# Patient Record
Sex: FEMALE | Race: BLACK OR AFRICAN AMERICAN | Employment: FULL TIME | URBAN - NONMETROPOLITAN AREA
[De-identification: names, ages, dates, MRNs, and addresses within clinical notes are randomized per-mention and may not be internally consistent; named-entity substitution may affect disease eponyms.]

---

## 2018-02-20 ENCOUNTER — HOSPITAL ENCOUNTER (OUTPATIENT)
Age: 48
Discharge: HOME | End: 2018-02-20
Payer: COMMERCIAL

## 2018-02-20 LAB
ALT SERPL-CCNC: 14 U/L (ref 11–66)
ANION GAP SERPL CALCULATED.3IONS-SCNC: 14 MEQ/L (ref 8–16)
AST SERPL-CCNC: 17 U/L (ref 5–40)
BASOPHILS # BLD: 0.6 %
BASOPHILS ABSOLUTE: 0 THOU/MM3 (ref 0–0.1)
BILIRUBIN URINE: NEGATIVE
BLOOD, URINE: NEGATIVE
BUN BLDV-MCNC: 9 MG/DL (ref 7–22)
CALCIUM SERPL-MCNC: 9.4 MG/DL (ref 8.5–10.5)
CHARACTER, URINE: CLEAR
CHLORIDE BLD-SCNC: 103 MEQ/L (ref 98–111)
CHOLESTEROL, TOTAL: 238 MG/DL (ref 100–199)
CO2: 24 MEQ/L (ref 23–33)
COLOR: YELLOW
CREAT SERPL-MCNC: 1.1 MG/DL (ref 0.4–1.2)
EOSINOPHIL # BLD: 0.8 %
EOSINOPHILS ABSOLUTE: 0 THOU/MM3 (ref 0–0.4)
GFR SERPL CREATININE-BSD FRML MDRD: 64 ML/MIN/1.73M2
GLUCOSE BLD-MCNC: 119 MG/DL (ref 70–108)
GLUCOSE, URINE: NEGATIVE MG/DL
HCT VFR BLD CALC: 39.6 % (ref 37–47)
HDLC SERPL-MCNC: 78 MG/DL
HEMOGLOBIN: 13.7 GM/DL (ref 12–16)
KETONES, URINE: NEGATIVE
LDL CHOLESTEROL CALCULATED: 137 MG/DL
LEUKOCYTE EST, POC: NEGATIVE
LYMPHOCYTES # BLD: 51.5 %
LYMPHOCYTES ABSOLUTE: 1.8 THOU/MM3 (ref 1–4.8)
MCH RBC QN AUTO: 31.6 PG (ref 27–31)
MCHC RBC AUTO-ENTMCNC: 34.6 GM/DL (ref 33–37)
MCV RBC AUTO: 91.4 FL (ref 81–99)
MONOCYTES # BLD: 7.4 %
MONOCYTES ABSOLUTE: 0.3 THOU/MM3 (ref 0.4–1.3)
NITRITE, URINE: NEGATIVE
NUCLEATED RED BLOOD CELLS: 0 /100 WBC
PDW BLD-RTO: 13.2 % (ref 11.5–14.5)
PH UA: 5.5
PLATELET # BLD: 287 THOU/MM3 (ref 130–400)
PMV BLD AUTO: 7.2 FL (ref 7.4–10.4)
POTASSIUM SERPL-SCNC: 4.3 MEQ/L (ref 3.5–5.2)
PROTEIN UA: NEGATIVE MG/DL
RBC # BLD: 4.33 MILL/MM3 (ref 4.2–5.4)
SEG NEUTROPHILS: 39.7 %
SEGMENTED NEUTROPHILS ABSOLUTE COUNT: 1.4 THOU/MM3 (ref 1.8–7.7)
SODIUM BLD-SCNC: 141 MEQ/L (ref 135–145)
SPECIFIC GRAVITY UA: 1.02 (ref 1–1.03)
TRIGL SERPL-MCNC: 115 MG/DL (ref 0–199)
UROBILINOGEN, URINE: 0.2 EU/DL
WBC # BLD: 3.5 THOU/MM3 (ref 4.8–10.8)

## 2018-02-20 PROCEDURE — 85025 COMPLETE CBC W/AUTO DIFF WBC: CPT

## 2018-02-20 PROCEDURE — 80061 LIPID PANEL: CPT

## 2018-02-20 PROCEDURE — 80048 BASIC METABOLIC PNL TOTAL CA: CPT

## 2018-02-20 PROCEDURE — 81003 URINALYSIS AUTO W/O SCOPE: CPT

## 2018-02-20 PROCEDURE — 84460 ALANINE AMINO (ALT) (SGPT): CPT

## 2018-02-20 PROCEDURE — 36415 COLL VENOUS BLD VENIPUNCTURE: CPT

## 2018-02-20 PROCEDURE — 84450 TRANSFERASE (AST) (SGOT): CPT

## 2018-05-03 ENCOUNTER — EMPLOYEE WELLNESS (OUTPATIENT)
Dept: OTHER | Age: 48
End: 2018-05-03

## 2018-05-03 LAB
CHOLESTEROL, TOTAL: 225 MG/DL (ref 0–199)
FASTING: YES
GLUCOSE BLD-MCNC: 122 MG/DL (ref 74–109)
HDLC SERPL-MCNC: 77 MG/DL (ref 40–90)
LDL CHOLESTEROL CALCULATED: 131 MG/DL
TRIGL SERPL-MCNC: 83 MG/DL (ref 0–199)

## 2018-05-14 VITALS — WEIGHT: 160 LBS

## 2018-09-28 ENCOUNTER — HOSPITAL ENCOUNTER (OUTPATIENT)
Dept: WOMENS IMAGING | Age: 48
Discharge: HOME | End: 2018-09-28
Payer: COMMERCIAL

## 2018-09-28 DIAGNOSIS — Z12.31 VISIT FOR SCREENING MAMMOGRAM: ICD-10-CM

## 2018-09-28 PROCEDURE — 77067 SCR MAMMO BI INCL CAD: CPT

## 2019-01-04 ENCOUNTER — NURSE TRIAGE (OUTPATIENT)
Dept: ADMINISTRATIVE | Age: 49
End: 2019-01-04

## 2019-04-12 ENCOUNTER — EMPLOYEE WELLNESS (OUTPATIENT)
Dept: OTHER | Age: 49
End: 2019-04-12

## 2019-04-12 LAB
CHOLESTEROL, TOTAL: 237 MG/DL (ref 0–199)
FASTING: YES
GLUCOSE BLD-MCNC: 94 MG/DL (ref 74–109)
HDLC SERPL-MCNC: 71 MG/DL (ref 40–90)
LDL CHOLESTEROL CALCULATED: 154 MG/DL
TRIGL SERPL-MCNC: 62 MG/DL (ref 0–199)

## 2019-04-15 VITALS — WEIGHT: 147 LBS

## 2020-09-21 ENCOUNTER — EMPLOYEE WELLNESS (OUTPATIENT)
Dept: OTHER | Age: 50
End: 2020-09-21

## 2020-09-21 LAB
CHOLESTEROL, TOTAL: 235 MG/DL (ref 0–199)
FASTING: YES
GLUCOSE BLD-MCNC: 104 MG/DL (ref 74–109)
HDLC SERPL-MCNC: 67 MG/DL (ref 40–90)
LDL CHOLESTEROL CALCULATED: 140 MG/DL
TRIGL SERPL-MCNC: 141 MG/DL (ref 0–199)

## 2020-09-23 ENCOUNTER — HOSPITAL ENCOUNTER (OUTPATIENT)
Dept: WOMENS IMAGING | Age: 50
Discharge: HOME | End: 2020-09-23
Payer: COMMERCIAL

## 2020-09-23 PROCEDURE — 77063 BREAST TOMOSYNTHESIS BI: CPT

## 2020-09-26 ENCOUNTER — NURSE ONLY (OUTPATIENT)
Dept: LAB | Age: 50
End: 2020-09-26

## 2020-09-26 LAB
ALBUMIN SERPL-MCNC: 4.2 G/DL (ref 3.5–5.1)
ALP BLD-CCNC: 58 U/L (ref 38–126)
ALT SERPL-CCNC: 17 U/L (ref 11–66)
ANION GAP SERPL CALCULATED.3IONS-SCNC: 11 MEQ/L (ref 8–16)
AST SERPL-CCNC: 19 U/L (ref 5–40)
AVERAGE GLUCOSE: 141 MG/DL (ref 70–126)
BASOPHILS # BLD: 0.7 %
BASOPHILS ABSOLUTE: 0 THOU/MM3 (ref 0–0.1)
BILIRUB SERPL-MCNC: 0.3 MG/DL (ref 0.3–1.2)
BILIRUBIN URINE: NEGATIVE
BLOOD, URINE: NEGATIVE
BUN BLDV-MCNC: 11 MG/DL (ref 7–22)
CALCIUM SERPL-MCNC: 9.5 MG/DL (ref 8.5–10.5)
CHARACTER, URINE: CLEAR
CHLORIDE BLD-SCNC: 105 MEQ/L (ref 98–111)
CHOLESTEROL, TOTAL: 255 MG/DL (ref 100–199)
CO2: 23 MEQ/L (ref 23–33)
COLOR: YELLOW
CREAT SERPL-MCNC: 1.1 MG/DL (ref 0.4–1.2)
EOSINOPHIL # BLD: 1.5 %
EOSINOPHILS ABSOLUTE: 0.1 THOU/MM3 (ref 0–0.4)
ERYTHROCYTE [DISTWIDTH] IN BLOOD BY AUTOMATED COUNT: 12.1 % (ref 11.5–14.5)
ERYTHROCYTE [DISTWIDTH] IN BLOOD BY AUTOMATED COUNT: 40.9 FL (ref 35–45)
GLUCOSE BLD-MCNC: 124 MG/DL (ref 70–108)
GLUCOSE, URINE: NEGATIVE MG/DL
HBA1C MFR BLD: 6.7 % (ref 4.4–6.4)
HCT VFR BLD CALC: 40.4 % (ref 37–47)
HDLC SERPL-MCNC: 70 MG/DL
HEMOGLOBIN: 13.7 GM/DL (ref 12–16)
IMMATURE GRANS (ABS): 0 THOU/MM3 (ref 0–0.07)
IMMATURE GRANULOCYTES: 0 %
KETONES, URINE: NEGATIVE
LDL CHOLESTEROL CALCULATED: 170 MG/DL
LEUKOCYTE EST, POC: NEGATIVE
LYMPHOCYTES # BLD: 59.5 %
LYMPHOCYTES ABSOLUTE: 2.4 THOU/MM3 (ref 1–4.8)
MCH RBC QN AUTO: 31.4 PG (ref 26–33)
MCHC RBC AUTO-ENTMCNC: 33.9 GM/DL (ref 32.2–35.5)
MCV RBC AUTO: 92.4 FL (ref 81–99)
MONOCYTES # BLD: 8 %
MONOCYTES ABSOLUTE: 0.3 THOU/MM3 (ref 0.4–1.3)
NITRITE, URINE: NEGATIVE
NUCLEATED RED BLOOD CELLS: 0 /100 WBC
PH UA: 5 (ref 5–9)
PLATELET # BLD: 274 THOU/MM3 (ref 130–400)
PMV BLD AUTO: 9.1 FL (ref 9.4–12.4)
POTASSIUM SERPL-SCNC: 5.1 MEQ/L (ref 3.5–5.2)
PROTEIN UA: NEGATIVE MG/DL
RBC # BLD: 4.37 MILL/MM3 (ref 4.2–5.4)
SEG NEUTROPHILS: 30.3 %
SEGMENTED NEUTROPHILS ABSOLUTE COUNT: 1.2 THOU/MM3 (ref 1.8–7.7)
SODIUM BLD-SCNC: 139 MEQ/L (ref 135–145)
SPECIFIC GRAVITY UA: 1.02 (ref 1–1.03)
T4 FREE: 0.98 NG/DL (ref 0.93–1.76)
TOTAL PROTEIN: 7.3 G/DL (ref 6.1–8)
TRIGL SERPL-MCNC: 76 MG/DL (ref 0–199)
TSH SERPL DL<=0.05 MIU/L-ACNC: 1.1 UIU/ML (ref 0.4–4.2)
UROBILINOGEN, URINE: 0.2 EU/DL (ref 0–1)
WBC # BLD: 4.1 THOU/MM3 (ref 4.8–10.8)

## 2020-10-06 ENCOUNTER — HOSPITAL ENCOUNTER (OUTPATIENT)
Dept: WOMENS IMAGING | Age: 50
End: 2020-10-06
Payer: COMMERCIAL

## 2020-10-06 ENCOUNTER — HOSPITAL ENCOUNTER (OUTPATIENT)
Dept: WOMENS IMAGING | Age: 50
Discharge: HOME | End: 2020-10-06
Payer: COMMERCIAL

## 2020-10-06 LAB — GFR SERPL CREATININE-BSD FRML MDRD: 63 ML/MIN/1.73M2

## 2020-10-06 PROCEDURE — G0279 TOMOSYNTHESIS, MAMMO: HCPCS

## 2021-02-26 ENCOUNTER — IMMUNIZATION (OUTPATIENT)
Dept: PRIMARY CARE CLINIC | Age: 51
End: 2021-02-26
Payer: COMMERCIAL

## 2021-02-26 PROCEDURE — 91300 COVID-19, PFIZER VACCINE 30MCG/0.3ML DOSE: CPT | Performed by: FAMILY MEDICINE

## 2021-02-26 PROCEDURE — 0001A COVID-19, PFIZER VACCINE 30MCG/0.3ML DOSE: CPT | Performed by: FAMILY MEDICINE

## 2021-03-05 ENCOUNTER — HOSPITAL ENCOUNTER (OUTPATIENT)
Dept: PHYSICAL THERAPY | Age: 51
Setting detail: THERAPIES SERIES
Discharge: HOME | End: 2021-03-05
Payer: COMMERCIAL

## 2021-03-05 PROCEDURE — 97161 PT EVAL LOW COMPLEX 20 MIN: CPT

## 2021-03-05 PROCEDURE — 97110 THERAPEUTIC EXERCISES: CPT

## 2021-03-05 NOTE — FLOWSHEET NOTE
** PLEASE SIGN, DATE AND TIME CERTIFICATION BELOW AND RETURN TO [facility] OUTPATIENT REHABILITATION (FAX #: [phone]). ATTEST/CO-SIGN IF ACCESSING VIA [employer]. THANK YOU.**    I certify that I have examined the patient below and determined that Physical Medicine and Rehabilitation service is necessary and that I approve the established plan of care for up to 90 days or as specifically noted. Attestation, signature or co-signature of physician indicates approval of certification requirements.    ________________________ ____________ __________  Physician Signature   Date   Time  [address] [facility]  PHYSICAL THERAPY  [x] EVALUATION  [] DAILY NOTE (LAND) [] DAILY NOTE (AQUATIC ) [] PROGRESS NOTE [] DISCHARGE NOTE    [x] [address] [facility]   [] [place] [address]    [] [address] [facility]   [] Carry Minder    Date: 3/5/2021  Patient Name:  Randee Gomes  : 1970  MRN: [id number]  CSN: [id number]    Referring Practitioner Manuel Nguyen DPM   Diagnosis Plantar fascial fibromatosis [M72.2]  Knuckle pads [M72.1]  Pain in right foot [M79.671]    Treatment Diagnosis B foot pain, B ankle stiffness, muscular weakness, unsteady on feet   Date of Evaluation 3/5/21    Additional Pertinent History NA      Functional Outcome Measure Used FAAM (Foot Ankle Ability Measure)   Functional Outcome Score 75/84 or 11% impairment (3/5/21)       Insurance: Primary: Payor: Rafael Rucker /  /  / ,   Secondary:    Authorization Information: 10% copay, unlimited visits, aquatic and modalities covered   Visit # 1, 1/10 for progress note   Visits Allowed: unlimited   Recertification Date:    Physician Follow-Up: unknown   Physician Orders:    History of Present Illness: 2 year history of foot pain, started when her work changed roles and she had to stand and walk more often.    Dr. Jeimy Sorenson gave her insoles, performing foot stretching exercises, tries to soak her feet, and wears shoes even inside her home. Started on Medrol pack this week. SUBJECTIVE: Patient c/o B pain in heels and feet, lateral foot and bottom of balls of feet. Most of the time her pain was bad first few steps out of bed, standing at work and walking. She gets pain through the shin and calf mostly on the Left. Social/Functional History and Current Status:  Medications and Allergies have been reviewed and are listed on Medical History Questionnaire. Ahsan Singleton lives alone in a multiple floor home with stairs and a handrail to enter. 4 steps to access home. Task Previous Current   ADLs  Independent Independent   IADL's Independent Modified Independent - takes sitting breaks frequently   Ambulation Independent Independent   Transfers Independent Independent   Recreation Independent  - walking for recreation, able to walk a couple hours Modified Independent has had to shorten her distance due to pain   Community Integration Independent Independent   Driving Active  Active    Work [employer]. 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 Full-Time. 8 hour days      OBJECTIVE:    Pain: 1/10 pain in B heels   Palpation Pain in plantar fascia origin bilaterally, 1st MTP and peroneals in the midleg. Observation Left foot more pes planus and valgus deformity compared to Right. Posture        Range of Motion L ankle DF 0-15 deg  R ankle DF 0-17 deg   Strength Toe flex/ext WFL   Ankle DF 5/5, Ankle PF 4+/5  Ankle Inv 5/5, Ankle Evr 5/5 bilaterally   Coordination    Sensation Left foot tingling at times with sleeping and standing   Bed Mobility    Transfers    Ambulation With no shoes, equal step no antalgia, but more Left foot valgus evident.     Stairs Reciprocal pattern, no LOB, no railing   Balance Single leg eyes closed 3 sec left, 7 sec R   Special Tests          TREATMENT   Precautions: Goes by \"Yashira\"     Pain: B heel pain X in shaded column indicates activity completed today   Modalities Parameters/  Location  Notes                     Manual Therapy Time/Technique  Notes                     Exercise/Intervention   Notes   Rolling arches on ball  3 min ea  x    Seated soleus stretch - foot flat on floor under chair 30 sec ea  x    Seated plantar fascia stretch - foot in lap, extending toes for self stretch 30 sec ea  x                                                              Specific Interventions Next Treatment: Start session with prone manual therapy to B gastroc, soleus, peroneals, Achilles, and plantar fascia. Soft tissue mobilization with Hawkgrips for IASTM. Follow with stretching, rolling arches with ball, and standing balance exercises. Finish with ice as needed for pain. Dry needling was also on script if needed for pain. Activity/Treatment Tolerance:  [x]  Patient tolerated treatment well  []  Patient limited by fatigue  []  Patient limited by pain   []  Patient limited by medical complications  []  Other:     Assessment: Patient presents with restricted mobility and pain consistent with physician diagnosis of B plantar fasciitis. She has difficulty working full shift at hospital, walking for recreation and exercise long distances. She would benefit from physical therapy to decrease foot pain, improve flexibility of plantar fascia and lower leg, and to improve balance and strength in order to tolerate full work shift and walking long distances. Body Structures/Functions/Activity Limitations: impaired balance, impaired ROM, impaired strength, pain and abnormal gait  Prognosis: good    GOALS:  Patient Goal: decrease foot pain    Short Term Goals:  Time Frame: 6 weeks    Patient will report decreased B heel pain from baseline 1/10 to less than 0/10 during therapy session in order to decrease sharp heel pain with first few steps out of bed.     Patient will improve B ankle AROM to 0-20 deg ankle DF, and negative heel pain with passive toe extension in order to navigate steps, squat and stand long durations at work. Patient will improve standing balance to 10 sec SLS eyes closed in order to stabilize B ankles with uneven surfaces and stairs. Patient will be IND with HEP in order to meet long term goals. Long Term Goals:  Time Frame: 12 weeks    Patient will improve B ankle strength to 5/5 all directions in order to stabilize ankles and foot for barefoot walking. Patient will improve score of FAAM questionnaire from baseline 11% impairment to at least 5% impaired in order to resume regular walking exercise and tolerate work more comfortably. Patient will ascend/descend 12 steps with no railing and reciprocal pattern in order to access all areas of her house and work. Patient Education:   [x]  HEP/Education Completed: Plan of Care, Goals, Moist heat, then rolling/stretching, and finish with ice. Wear footwear in house. [employer] Access Code: [email or web address]   []  No new Education completed  []  Reviewed Prior HEP      []  Patient verbalized and/or demonstrated understanding of education provided. []  Patient unable to verbalize and/or demonstrate understanding of education provided. Will continue education. []  Barriers to learning: None    PLAN:  Treatment Recommendations: Strengthening, Range of Motion, Balance Training, Gait Training, Stair Training, Manual Therapy - Soft Tissue Mobilization, Manual Therapy - Joint Manipulation, Pain Management, Home Exercise Program, Patient Education, Safety Education and Training, Integrative Dry Needling, Aquatics and Modalities    [x]  Plan of care initiated. Plan to see patient 2 times per week for 12 weeks to address the treatment planned outlined above.   []  Continue with current plan of care  []  Modify plan of care as follows:    []  Hold pending physician visit  []  Discharge    Time In 1115   Time Out 1215   Timed Code Minutes: 10 min   Total Treatment Time: 60 min       Electronically Signed by: Chasidy Thomson

## 2021-03-09 ENCOUNTER — HOSPITAL ENCOUNTER (OUTPATIENT)
Dept: PHYSICAL THERAPY | Age: 51
Setting detail: THERAPIES SERIES
Discharge: HOME | End: 2021-03-09
Payer: COMMERCIAL

## 2021-03-09 PROCEDURE — 97110 THERAPEUTIC EXERCISES: CPT

## 2021-03-09 PROCEDURE — 97140 MANUAL THERAPY 1/> REGIONS: CPT

## 2021-03-09 NOTE — PROGRESS NOTES
[address] [facility]  PHYSICAL THERAPY  [] EVALUATION  [x] DAILY NOTE (LAND) [] DAILY NOTE (AQUATIC ) [] PROGRESS NOTE [] DISCHARGE NOTE    [x] OUTPATIENT REHABILITATION CENTER [facility]   [] [place] [address]    [] [facility]   [] Juana Alvares    Date: 3/9/2021  Patient Name:  Dennie Milroy  : 1970  MRN: [id number]  CSN: [id number]    Referring Practitioner Katlin Friend DPM   Diagnosis Plantar fascial fibromatosis [M72.2]  Knuckle pads [M72.1]  Pain in right foot [M79.671]    Treatment Diagnosis B foot pain, B ankle stiffness, muscular weakness, unsteady on feet   Date of Evaluation 3/5/21    Additional Pertinent History NA      Functional Outcome Measure Used FAAM (Foot Ankle Ability Measure)   Functional Outcome Score 75/84 or 11% impairment (3/5/21)       Insurance: Primary: Payor: Mahin Hassan /  /  / ,   Secondary:    Authorization Information: 10% copay, unlimited visits, aquatic and modalities covered   Visit # 2, 2/10 for progress note   Visits Allowed: unlimited   Recertification Date: 34   Physician Follow-Up: unknown   Physician Orders:    History of Present Illness: 2 year history of foot pain, started when her work changed roles and she had to stand and walk more often. Dr. Verito Stahl gave her insoles, performing foot stretching exercises, tries to soak her feet, and wears shoes even inside her home. Started on Medrol pack this week. SUBJECTIVE:  Patient states the stretches at home seem to be ok. Complaints of pain increasing after being up on her feet. \"I just want it to go away. I want my feet back. \"    TREATMENT   Precautions: Goes by \"Yashira\"     Pain: 8/10 Bilateral feet heel and arch    X in shaded column indicates activity completed today   Modalities Parameters/  Location  Notes               Manual Therapy Time/Technique  Notes   Astym to Right Lower leg 10 minutes X Restrictions noted at ankle mortice, soleus muscle belly, gastroc, peroneals, heel, plantar fascia   Astyem to Left Lower leg 10 minutes X Restrictions noted at ankle mortice, soleus muscle belly, achilles tendon, heel, plantar fascia         Exercise/Intervention   Notes   SportsArt bike (seat 2) Level 2 5 minutes X Warm-up prior to Astym          Rolling arches on ball 3 min ea      Seated soleus stretch - foot flat on floor under chair 3x bilateral  20 seconds X    Seated plantar fascia stretch - foot in lap, extending toes for self stretch 30 sec ea  X           Gastroc stretch at steps 3x bilateral  20 seconds X    Heel raises 10x  X    Plantar fascia stretch at wall 3x bilateral 15 seconds X                                  Specific Interventions Next Treatment: Start session with prone manual therapy to B gastroc, soleus, peroneals, Achilles, and plantar fascia. Soft tissue mobilization with Hawkgrips for IASTM. Follow with stretching, rolling arches with ball, and standing balance exercises. Finish with ice as needed for pain. Dry needling was also on script if needed for pain. Activity/Treatment Tolerance:  [x]  Patient tolerated treatment well  []  Patient limited by fatigue  []  Patient limited by pain   []  Patient limited by medical complications  []  Other:     Assessment: Introduced patient to Astym treatment today to decrease restrictions and promote healing of healthy tissue. Patient had restrictions as noted above. Good tolerance of treatment with stretches post-Astym. Significant decrease in pain with patient rating pain 3/10 at end of session. GOALS:  Patient Goal: decrease foot pain    Short Term Goals:  Time Frame: 6 weeks    Patient will report decreased B heel pain from baseline 1/10 to less than 0/10 during therapy session in order to decrease sharp heel pain with first few steps out of bed.     Patient will improve B ankle AROM to 0-20 deg ankle DF, and negative heel pain with passive toe extension in order to navigate steps, squat and stand long durations at work. Patient will improve standing balance to 10 sec SLS eyes closed in order to stabilize B ankles with uneven surfaces and stairs. Patient will be IND with HEP in order to meet long term goals. Long Term Goals:  Time Frame: 12 weeks    Patient will improve B ankle strength to 5/5 all directions in order to stabilize ankles and foot for barefoot walking. Patient will improve score of FAAM questionnaire from baseline 11% impairment to at least 5% impaired in order to resume regular walking exercise and tolerate work more comfortably. Patient will ascend/descend 12 steps with no railing and reciprocal pattern in order to access all areas of her house and work. Patient Education:   [x]  HEP/Education Completed:  Astym treatment and benefits. Use of frozen water bottle. [employer] Access Code: [email or web address]   []  No new Education completed  [x]  Reviewed Prior HEP      []  Patient verbalized and/or demonstrated understanding of education provided. []  Patient unable to verbalize and/or demonstrate understanding of education provided. Will continue education. []  Barriers to learning: None    PLAN:  Treatment Recommendations: Strengthening, Range of Motion, Balance Training, Gait Training, Stair Training, Manual Therapy - Soft Tissue Mobilization, Manual Therapy - Joint Manipulation, Pain Management, Home Exercise Program, Patient Education, Safety Education and Training, Integrative Dry Needling, Aquatics and Modalities    []  Plan of care initiated. Plan to see patient 2 times per week for 12 weeks to address the treatment planned outlined above.   [x]  Continue with current plan of care  []  Modify plan of care as follows:    []  Hold pending physician visit  []  Discharge    Time In 1500   Time Out 1540   Timed Code Minutes: 40 min   Total Treatment Time: 40 min       Electronically Signed by: Vernell Dumont

## 2021-03-12 ENCOUNTER — HOSPITAL ENCOUNTER (OUTPATIENT)
Dept: PHYSICAL THERAPY | Age: 51
Setting detail: THERAPIES SERIES
Discharge: HOME | End: 2021-03-12
Payer: COMMERCIAL

## 2021-03-12 PROCEDURE — 97110 THERAPEUTIC EXERCISES: CPT

## 2021-03-12 PROCEDURE — 97140 MANUAL THERAPY 1/> REGIONS: CPT

## 2021-03-12 NOTE — PROGRESS NOTES
[address] [facility]  PHYSICAL THERAPY  [] EVALUATION  [x] DAILY NOTE (LAND) [] DAILY NOTE (AQUATIC ) [] PROGRESS NOTE [] DISCHARGE NOTE    [x] OUTPATIENT REHABILITATION CENTER [facility]   [] [place] [address]    [] [facility]   [] Doc Has    Date: 3/12/2021  Patient Name:  Martina Zhang  : 1970  MRN: [id number]  CSN: [id number]    Referring Practitioner Isaac Spears DPM   Diagnosis Plantar fascial fibromatosis [M72.2]  Knuckle pads [M72.1]  Pain in right foot [M79.671]    Treatment Diagnosis B foot pain, B ankle stiffness, muscular weakness, unsteady on feet   Date of Evaluation 3/5/21    Additional Pertinent History NA      Functional Outcome Measure Used FAAM (Foot Ankle Ability Measure)   Functional Outcome Score 75/84 or 11% impairment (3/5/21)       Insurance: Primary: Payor: Favio Pritchett /  /  / ,   Secondary:    Authorization Information: 10% copay, unlimited visits, aquatic and modalities covered   Visit # 3, 3/10 for progress note   Visits Allowed: unlimited   Recertification Date: 43   Physician Follow-Up: unknown   Physician Orders:    History of Present Illness: 2 year history of foot pain, started when her work changed roles and she had to stand and walk more often. Dr. Nii Calderon gave her insoles, performing foot stretching exercises, tries to soak her feet, and wears shoes even inside her home. Started on Medrol pack this week. SUBJECTIVE:  Patient has been rolling and digging with her arches, stretching her gastroc frequently. She notices less left leg outer pain but still 5/10 in the heels while working.      TREATMENT   Precautions: Goes by Author Coleman"     Pain: 5/10 Bilateral feet heel and arch    X in shaded column indicates activity completed today   Modalities Parameters/  Location  Notes               Manual Therapy Time/Technique  Notes   Soft tissue mobilization with Hawkgrips HG7 to Right Lower leg 10 minutes X Restrictions noted at ankle mortice, soleus muscle belly, gastroc, peroneals, achilles, heel, plantar fascia   Soft tissue mobilization with Hawkgrips HG7 to Left Lower leg 10 minutes X Restrictions noted at ankle mortice, soleus muscle belly, achilles tendon, achilles, heel, plantar fascia   Passive toe extension and ankle DF stretch 3x 30 sec x    Exercise/Intervention   Notes   SportsArt bike (seat 2) Level 2 5 minutes X Warm-up prior to manual therapy          Rolling arches on ball 3 min ea      Seated soleus stretch - foot flat on floor under chair 3x bilateral  20 seconds     Seated plantar fascia stretch - foot in lap, extending toes for self stretch 30 sec ea             Gastroc stretch at steps 3x bilateral  20 seconds X    Heel raises 10x      Plantar fascia stretch at wall 3x bilateral 15 seconds X    Standing gastroc stretch 3x 30 sec x                           Specific Interventions Next Treatment: Start session with prone manual therapy to B gastroc, soleus, peroneals, Achilles, and plantar fascia. Soft tissue mobilization with Hawkgrips for IASTM. Follow with stretching, rolling arches with ball, and standing balance exercises. Finish with ice as needed for pain. Dry needling was also on script if needed for pain. Activity/Treatment Tolerance:  [x]  Patient tolerated treatment well  []  Patient limited by fatigue  []  Patient limited by pain   []  Patient limited by medical complications  []  Other:     Assessment: Continued with emphasis on manual therapy of B lower leg muscles and plantar fascia. Good tolerance and followed with stretching. Pain reduced to 3/10 at end. GOALS:  Patient Goal: decrease foot pain    Short Term Goals:  Time Frame: 6 weeks    Patient will report decreased B heel pain from baseline 1/10 to less than 0/10 during therapy session in order to decrease sharp heel pain with first few steps out of bed.     Patient will improve B ankle AROM to 0-20 deg ankle DF, and negative heel pain with passive toe extension in order to navigate steps, squat and stand long durations at work. Patient will improve standing balance to 10 sec SLS eyes closed in order to stabilize B ankles with uneven surfaces and stairs. Patient will be IND with HEP in order to meet long term goals. Long Term Goals:  Time Frame: 12 weeks    Patient will improve B ankle strength to 5/5 all directions in order to stabilize ankles and foot for barefoot walking. Patient will improve score of FAAM questionnaire from baseline 11% impairment to at least 5% impaired in order to resume regular walking exercise and tolerate work more comfortably. Patient will ascend/descend 12 steps with no railing and reciprocal pattern in order to access all areas of her house and work. Patient Education:   [x]  HEP/Education Completed:  Astym treatment and benefits. Use of frozen water bottle. [email or web address] Access Code: [email or web address]   []  No new Education completed  [x]  Reviewed Prior HEP      []  Patient verbalized and/or demonstrated understanding of education provided. []  Patient unable to verbalize and/or demonstrate understanding of education provided. Will continue education. []  Barriers to learning: None    PLAN:  Treatment Recommendations: Strengthening, Range of Motion, Balance Training, Gait Training, Stair Training, Manual Therapy - Soft Tissue Mobilization, Manual Therapy - Joint Manipulation, Pain Management, Home Exercise Program, Patient Education, Safety Education and Training, Integrative Dry Needling, Aquatics and Modalities    []  Plan of care initiated. Plan to see patient 2 times per week for 12 weeks to address the treatment planned outlined above. [x]  Continue with current plan of care  []  Modify plan of care as follows:    []  Hold pending physician visit  []  Discharge    Time In 1035   Time Out 1110   Timed Code Minutes: 35   Total Treatment Time: 35       Electronically Signed by: Rashi Zhao. Horacio Marvin

## 2021-03-16 ENCOUNTER — HOSPITAL ENCOUNTER (OUTPATIENT)
Dept: PHYSICAL THERAPY | Age: 51
Setting detail: THERAPIES SERIES
Discharge: HOME | End: 2021-03-16
Payer: COMMERCIAL

## 2021-03-16 PROCEDURE — 97110 THERAPEUTIC EXERCISES: CPT

## 2021-03-16 PROCEDURE — 97140 MANUAL THERAPY 1/> REGIONS: CPT

## 2021-03-16 NOTE — PROGRESS NOTES
[address] [facility]  PHYSICAL THERAPY  [] EVALUATION  [x] DAILY NOTE (LAND) [] DAILY NOTE (AQUATIC ) [] PROGRESS NOTE [] DISCHARGE NOTE    [x] OUTPATIENT REHABILITATION CENTER [facility]   [] [place] [address]    [] [facility]   [] Damian Murry    Date: 3/16/2021  Patient Name:  Garth Doshi  : 1970  MRN: [id number]  CSN: [id number]    Referring Practitioner Audelia Bumpers, DPM   Diagnosis Plantar fascial fibromatosis [M72.2]  Knuckle pads [M72.1]  Pain in right foot [M79.671]    Treatment Diagnosis B foot pain, B ankle stiffness, muscular weakness, unsteady on feet   Date of Evaluation 3/5/21    Additional Pertinent History NA      Functional Outcome Measure Used FAAM (Foot Ankle Ability Measure)   Functional Outcome Score 75/84 or 11% impairment (3/5/21)       Insurance: Primary: Payor: Diallo Hale /  /  / ,   Secondary:    Authorization Information: 10% copay, unlimited visits, aquatic and modalities covered   Visit # 4, 4/10 for progress note   Visits Allowed: unlimited   Recertification Date:    Physician Follow-Up: unknown   Physician Orders:    History of Present Illness: 2 year history of foot pain, started when her work changed roles and she had to stand and walk more often. Dr. Tatiana Wei gave her insoles, performing foot stretching exercises, tries to soak her feet, and wears shoes even inside her home. Started on Medrol pack this week. SUBJECTIVE:  Patient reports feels therapy is helping. States the knots and tightness in her calfs is a lot better. States right foot not feeling too bad today, pain more on left side but is getting better.   TREATMENT   Precautions: Goes by Samantha Vargas"     Pain: 4/10 Bilateral feet heel and arch    X in shaded column indicates activity completed today   Modalities Parameters/  Location  Notes               Manual Therapy Time/Technique  Notes   Soft tissue mobilization with Hawkgrips HG7 to Right Lower leg 10 minutes X Restrictions noted at ankle mortice, soleus muscle belly, gastroc, peroneals, achilles, heel, plantar fascia   Soft tissue mobilization with Hawkgrips HG7 to Left Lower leg 10 minutes X Restrictions noted at ankle mortice, soleus muscle belly, achilles tendon, achilles, heel, plantar fascia   Passive toe extension and ankle DF stretch 3x 30 sec     Exercise/Intervention   Notes   SportsArt bike (seat 2) Level 2 5 minutes X Warm-up prior to manual therapy          Rolling arches on ball 3 min ea      Seated soleus stretch - foot flat on floor under chair 3x bilateral  20 seconds     Seated plantar fascia stretch - foot in lap, extending toes for self stretch 30 sec ea             Gastroc stretch at steps 3x bilateral  20 seconds X    Heel raises 10x      Plantar fascia stretch at wall 3x bilateral 15 seconds X    Standing gastroc stretch 3x 30 sec x                           Specific Interventions Next Treatment: Start session with prone manual therapy to B gastroc, soleus, peroneals, Achilles, and plantar fascia. Soft tissue mobilization with Hawkgrips for IASTM. Follow with stretching, rolling arches with ball, and standing balance exercises. Finish with ice as needed for pain. Dry needling was also on script if needed for pain. Activity/Treatment Tolerance:  [x]  Patient tolerated treatment well  []  Patient limited by fatigue  []  Patient limited by pain   []  Patient limited by medical complications  []  Other:     Assessment: Patient with less tightness and knotting throughout lower legs and plantar fascia/medial arches. Still a grisly feeling along Achilles, lateral calfs and along medial arch bilaterally. Patient doing well with stretches and no increase in pain with the bike. Patient asked about dry needling so will discuss with evaluating PT.     GOALS:  Patient Goal: decrease foot pain    Short Term Goals:  Time Frame: 6 weeks    Patient will report decreased B heel pain from baseline 1/10 to less than 0/10 during therapy session in order to decrease sharp heel pain with first few steps out of bed. Patient will improve B ankle AROM to 0-20 deg ankle DF, and negative heel pain with passive toe extension in order to navigate steps, squat and stand long durations at work. Patient will improve standing balance to 10 sec SLS eyes closed in order to stabilize B ankles with uneven surfaces and stairs. Patient will be IND with HEP in order to meet long term goals. Long Term Goals:  Time Frame: 12 weeks    Patient will improve B ankle strength to 5/5 all directions in order to stabilize ankles and foot for barefoot walking. Patient will improve score of FAAM questionnaire from baseline 11% impairment to at least 5% impaired in order to resume regular walking exercise and tolerate work more comfortably. Patient will ascend/descend 12 steps with no railing and reciprocal pattern in order to access all areas of her house and work. Patient Education:   [x]  HEP/Education Completed:  Continue with stretches. PT to ask evaluating PT about dry needling   Med[employer] Access Code: [email or web address]   []  No new Education completed  [x]  Reviewed Prior HEP      []  Patient verbalized and/or demonstrated understanding of education provided. []  Patient unable to verbalize and/or demonstrate understanding of education provided. Will continue education. []  Barriers to learning: None    PLAN:  []  Plan of care initiated. Plan to see patient 2 times per week for 12 weeks to address the treatment planned outlined above.   [x]  Continue with current plan of care  []  Modify plan of care as follows:    []  Hold pending physician visit  []  Discharge    Time In 0920   Time Out 1005   Timed Code Minutes: 45   Total Treatment Time: 45       Electronically Signed by: [place] [id number]

## 2021-03-19 ENCOUNTER — HOSPITAL ENCOUNTER (OUTPATIENT)
Dept: PHYSICAL THERAPY | Age: 51
Setting detail: THERAPIES SERIES
Discharge: HOME | End: 2021-03-19
Payer: COMMERCIAL

## 2021-03-19 ENCOUNTER — IMMUNIZATION (OUTPATIENT)
Dept: PRIMARY CARE CLINIC | Age: 51
End: 2021-03-19
Payer: COMMERCIAL

## 2021-03-19 PROCEDURE — 97140 MANUAL THERAPY 1/> REGIONS: CPT

## 2021-03-19 PROCEDURE — 97110 THERAPEUTIC EXERCISES: CPT

## 2021-03-19 PROCEDURE — 91300 COVID-19, PFIZER VACCINE 30MCG/0.3ML DOSE: CPT

## 2021-03-19 PROCEDURE — 0002A PR IMM ADMN SARSCOV2 30MCG/0.3ML DIL RECON 2ND DOSE: CPT

## 2021-03-19 NOTE — PROGRESS NOTES
[address] [facility]  PHYSICAL THERAPY  [] EVALUATION  [x] DAILY NOTE (LAND) [] DAILY NOTE (AQUATIC ) [] PROGRESS NOTE [] DISCHARGE NOTE    [x] OUTPATIENT REHABILITATION CENTER [facility]   [] [place] [address]    [] [facility]   [] Jass Cameron    Date: 3/19/2021  Patient Name:  Reginaldo Birch  : 1970   MRN: [id number]  CSN: [id number]    Referring Practitioner Yuliya Simons DPM   Diagnosis Plantar fascial fibromatosis [M72.2]  Knuckle pads [M72.1]  Pain in right foot [M79.671]    Treatment Diagnosis B foot pain, B ankle stiffness, muscular weakness, unsteady on feet   Date of Evaluation 3/5/21    Additional Pertinent History NA      Functional Outcome Measure Used FAAM (Foot Ankle Ability Measure)   Functional Outcome Score 75/84 or 11% impairment (3/5/21)       Insurance: Primary: Payor: Gian Reyes /  /  / ,   Secondary:    Authorization Information: 10% copay, unlimited visits, aquatic and modalities covered   Visit # 5, 5/10 for progress note   Visits Allowed: unlimited   Recertification Date: 3/18/96   Physician Follow-Up: unknown   Physician Orders:    History of Present Illness: 2 year history of foot pain, started when her work changed roles and she had to stand and walk more often. Dr. Michael Castorena gave her insoles, performing foot stretching exercises, tries to soak her feet, and wears shoes even inside her home. Started on Medrol pack this week. SUBJECTIVE:  Patient states pain is getting better and therapy does seem to be helping. Patient would like to trial dry needling to see if that helps.      TREATMENT   Precautions: Goes by Addie Brown"     Pain: 2/10 Bilateral feet heel and arch    X in shaded column indicates activity completed today   Modalities Parameters/  Location  Notes               Manual Therapy Time/Technique  Notes   Astym to Right Lower Leg 10 minutes X Restrictions noted at ankle mortice, soleus muscle belly, gastroc, peroneals, achilles,

## 2021-03-23 ENCOUNTER — HOSPITAL ENCOUNTER (OUTPATIENT)
Dept: PHYSICAL THERAPY | Age: 51
Setting detail: THERAPIES SERIES
Discharge: HOME | End: 2021-03-23
Payer: COMMERCIAL

## 2021-03-23 PROCEDURE — 97110 THERAPEUTIC EXERCISES: CPT

## 2021-03-23 PROCEDURE — 97140 MANUAL THERAPY 1/> REGIONS: CPT

## 2021-03-23 NOTE — PROGRESS NOTES
[address] [facility]  PHYSICAL THERAPY  [] EVALUATION  [x] DAILY NOTE (LAND) [] DAILY NOTE (AQUATIC ) [] PROGRESS NOTE [] DISCHARGE NOTE    [x] OUTPATIENT REHABILITATION CENTER [facility]   [] [place] [address]    [] [facility]   [] Alex Lopez    Date: 3/23/2021  Patient Name:  Jef Cueva  : 1970   MRN: [id number]  CSN: [id number]    Referring Practitioner Anatoly Barros DPM   Diagnosis Plantar fascial fibromatosis [M72.2]  Knuckle pads [M72.1]  Pain in right foot [M79.671]    Treatment Diagnosis B foot pain, B ankle stiffness, muscular weakness, unsteady on feet   Date of Evaluation 3/5/21    Additional Pertinent History NA      Functional Outcome Measure Used FAAM (Foot Ankle Ability Measure)   Functional Outcome Score 75/84 or 11% impairment (3/5/21)       Insurance: Primary: Payor: Kuldip Graves /  /  / ,   Secondary:    Authorization Information: 10% copay, unlimited visits, aquatic and modalities covered   Visit # 6, 6/10 for progress note   Visits Allowed: unlimited   Recertification Date:    Physician Follow-Up: unknown   Physician Orders:    History of Present Illness: 2 year history of foot pain, started when her work changed roles and she had to stand and walk more often. Dr. Jose De Leon gave her insoles, performing foot stretching exercises, tries to soak her feet, and wears shoes even inside her home. Started on Medrol pack this week. SUBJECTIVE:  Patient reports the Astym treatment seems to be helping. Patient would like to try dry needling at next session. Admits her feet were hurting a lot yesterday and thinks its from being on her feet for work over the weekend.       TREATMENT   Precautions: Goes by Wilver Lao"     Pain: 3/10 Bilateral feet heel and arch    X in shaded column indicates activity completed today   Modalities Parameters/  Location  Notes               Manual Therapy Time/Technique  Notes   Astym to Right Lower Leg 10 minutes X Restrictions noted at ankle mortice, soleus muscle belly, lateral gastroc, peroneals, achilles, medial arch, plantar fascia   Astym to Left Lower Leg 10 minutes X Restrictions noted at ankle mortice, soleus muscle belly, lateral gastroc, achilles tendon, heel, plantar fascia      Soft tissue mobilization with Hawkgrips HG7 to Right Lower leg 10 minutes     Soft tissue mobilization with Hawkgrips HG7 to Left Lower leg 10 minutes     Passive toe extension and ankle DF stretch 3x 30 sec     Exercise/Intervention   Notes   SportsArt bike (seat 2) Level 2 5 minutes X Warm-up prior to manual therapy          Rolling arches on ball 3 min ea      Seated soleus stretch - foot flat on floor under chair 3x bilateral  20 seconds X    Seated plantar fascia stretch - foot in lap, extending toes for self stretch 30 sec ea             Gastroc stretch at steps 3x bilateral  30 seconds X    Heel/toe raises off edge of steps 15x  X    Plantar fascia stretch at wall 3x bilateral 15 seconds X    Standing gastroc stretch 3x 30 seconds X    Rockerboard forward/back, side to side 15x 30 seconds X                    Specific Interventions Next Treatment: Start session with prone manual therapy to B gastroc, soleus, peroneals, Achilles, and plantar fascia. Soft tissue mobilization with Hawkgrips for IASTM. Follow with stretching, rolling arches with ball, and standing balance exercises. Finish with ice as needed for pain. Dry needling was also on script if needed for pain. Activity/Treatment Tolerance:  [x]  Patient tolerated treatment well  []  Patient limited by fatigue  []  Patient limited by pain   []  Patient limited by medical complications  []  Other:     Assessment:  Continued Astym treatment today to decrease restrictions and promote healing of healthy tissue. Patient continues to have restrictions as noted above but it does seem to be improving. Patient felt better at end of session.  Progressed activities today with adding [employer]. GOALS:  Patient Goal: decrease foot pain    Short Term Goals:  Time Frame: 6 weeks    Patient will report decreased B heel pain from baseline 1/10 to less than 0/10 during therapy session in order to decrease sharp heel pain with first few steps out of bed. Patient will improve B ankle AROM to 0-20 deg ankle DF, and negative heel pain with passive toe extension in order to navigate steps, squat and stand long durations at work. Patient will improve standing balance to 10 sec SLS eyes closed in order to stabilize B ankles with uneven surfaces and stairs. Patient will be IND with HEP in order to meet long term goals. Long Term Goals:  Time Frame: 12 weeks    Patient will improve B ankle strength to 5/5 all directions in order to stabilize ankles and foot for barefoot walking. Patient will improve score of FAAM questionnaire from baseline 11% impairment to at least 5% impaired in order to resume regular walking exercise and tolerate work more comfortably. Patient will ascend/descend 12 steps with no railing and reciprocal pattern in order to access all areas of her house and work. Patient Education:   [x]  HEP/Education Completed:  Continue with stretches. [facility]   [facility] Access Code: [email or web address]   []  No new Education completed  [x]  Reviewed Prior HEP      []  Patient verbalized and/or demonstrated understanding of education provided. []  Patient unable to verbalize and/or demonstrate understanding of education provided. Will continue education. []  Barriers to learning: None    PLAN:  []  Plan of care initiated. Plan to see patient 2 times per week for 12 weeks to address the treatment planned outlined above.   [x]  Continue with current plan of care  []  Modify plan of care as follows:    []  Hold pending physician visit  []  Discharge    Time In 1345   Time Out 1430   Timed Code Minutes: 45 min   Total Treatment Time: 45 min       Electronically Signed by: Oliver Larson

## 2021-03-26 ENCOUNTER — APPOINTMENT (OUTPATIENT)
Dept: PHYSICAL THERAPY | Age: 51
End: 2021-03-26
Payer: COMMERCIAL

## 2021-04-14 NOTE — DISCHARGE SUMMARY
[address] [facility]  PHYSICAL THERAPY  [] EVALUATION  [] DAILY NOTE (LAND) [] DAILY NOTE (AQUATIC ) [] PROGRESS NOTE [x] DISCHARGE NOTE    [x] OUTPATIENT REHABILITATION CENTER [facility]   [] [facility]    [] [facility]   [] Zuhair Crowe    Date: 2021  Patient Name:  Jason Retana  : 1970   MRN: [id number]  CSN: [id number]    Referring Practitioner Abundio Liang DPM   Diagnosis Plantar fascial fibromatosis [M72.2]  Knuckle pads [M72.1]  Pain in right foot [M79.671]    Treatment Diagnosis B foot pain, B ankle stiffness, muscular weakness, unsteady on feet   Date of Evaluation 3/5/21    Additional Pertinent History NA      Functional Outcome Measure Used FAAM (Foot Ankle Ability Measure)   Functional Outcome Score 75/84 or 11% impairment (3/5/21)       Insurance: Primary: Payor: Binh Tello /  /  / ,   Secondary:    Authorization Information: 10% copay, unlimited visits, aquatic and modalities covered   Visit # 6, 6/10 for progress note   Visits Allowed: unlimited   Recertification Date:    Physician Follow-Up: unknown   Physician Orders:    History of Present Illness: 2 year history of foot pain, started when her work changed roles and she had to stand and walk more often. Dr. Modesta Ortiz gave her insoles, performing foot stretching exercises, tries to soak her feet, and wears shoes even inside her home. Started on Medrol pack this week. Patient discharged due to: Patient did not return calls to schedule additional sessions. .  No reassessment was performed with primary PT, but at last treatment she was having improvement in pain with manual therapy treatment and ASTYM. She cancelled her final assessment visit and has not rescheduled.        Electronically Signed by: Giuliana Marroquin

## 2021-08-11 LAB
CHOLESTEROL, TOTAL: 250 MG/DL (ref 0–199)
FASTING: YES
GLUCOSE BLD-MCNC: 126 MG/DL (ref 74–109)
HBA1C MFR BLD: 7.1 % (ref 4.4–6.4)
HDLC SERPL-MCNC: 68 MG/DL (ref 40–90)
LDL CHOLESTEROL CALCULATED: 167 MG/DL
TRIGL SERPL-MCNC: 76 MG/DL (ref 0–199)

## 2021-10-26 ENCOUNTER — HOSPITAL ENCOUNTER (OUTPATIENT)
Dept: WOMENS IMAGING | Age: 51
Discharge: HOME | End: 2021-10-26
Payer: COMMERCIAL

## 2021-10-26 DIAGNOSIS — Z12.31 VISIT FOR SCREENING MAMMOGRAM: ICD-10-CM

## 2021-10-26 PROCEDURE — 77063 BREAST TOMOSYNTHESIS BI: CPT

## 2022-03-31 ENCOUNTER — HOSPITAL ENCOUNTER (OUTPATIENT)
Age: 52
Discharge: HOME | End: 2022-03-31
Payer: COMMERCIAL

## 2022-03-31 LAB
ALBUMIN SERPL-MCNC: 4.3 G/DL (ref 3.5–5.1)
ALP BLD-CCNC: 54 U/L (ref 38–126)
ALT SERPL-CCNC: 13 U/L (ref 11–66)
AST SERPL-CCNC: 19 U/L (ref 5–40)
BILIRUB SERPL-MCNC: 0.4 MG/DL (ref 0.3–1.2)
BILIRUBIN DIRECT: < 0.2 MG/DL (ref 0–0.3)
TOTAL PROTEIN: 6.9 G/DL (ref 6.1–8)

## 2022-03-31 PROCEDURE — 36415 COLL VENOUS BLD VENIPUNCTURE: CPT

## 2022-03-31 PROCEDURE — 80076 HEPATIC FUNCTION PANEL: CPT

## 2022-04-11 ENCOUNTER — NURSE TRIAGE (OUTPATIENT)
Dept: OTHER | Facility: CLINIC | Age: 52
End: 2022-04-11

## 2022-04-13 LAB
CHOLESTEROL, TOTAL: 227 MG/DL (ref 0–199)
FASTING: YES
GLUCOSE BLD-MCNC: 129 MG/DL (ref 74–109)
HDLC SERPL-MCNC: 65 MG/DL (ref 40–90)
LDL CHOLESTEROL CALCULATED: 143 MG/DL
TRIGL SERPL-MCNC: 94 MG/DL (ref 0–199)

## 2022-05-19 ENCOUNTER — HOSPITAL ENCOUNTER (OUTPATIENT)
Dept: CT IMAGING | Age: 52
Discharge: HOME | End: 2022-05-19
Payer: COMMERCIAL

## 2022-05-19 DIAGNOSIS — R10.9 ABDOMINAL PAIN, UNSPECIFIED ABDOMINAL LOCATION: ICD-10-CM

## 2022-05-19 PROCEDURE — 74177 CT ABD & PELVIS W/CONTRAST: CPT

## 2022-05-19 PROCEDURE — 6360000004 HC RX CONTRAST MEDICATION: Performed by: NURSE PRACTITIONER

## 2022-05-19 RX ADMIN — IOPAMIDOL 80 ML: 755 INJECTION, SOLUTION INTRAVENOUS at 10:34

## 2022-05-19 RX ADMIN — IOHEXOL 50 ML: 240 INJECTION, SOLUTION INTRATHECAL; INTRAVASCULAR; INTRAVENOUS; ORAL at 10:34

## 2022-06-03 ENCOUNTER — HOSPITAL ENCOUNTER (OUTPATIENT)
Dept: ULTRASOUND IMAGING | Age: 52
Discharge: HOME | End: 2022-06-03
Payer: COMMERCIAL

## 2022-06-03 DIAGNOSIS — R10.9 ABDOMINAL PAIN, UNSPECIFIED ABDOMINAL LOCATION: ICD-10-CM

## 2022-06-03 PROCEDURE — 76705 ECHO EXAM OF ABDOMEN: CPT

## 2022-06-15 ENCOUNTER — OFFICE VISIT (OUTPATIENT)
Dept: SURGERY | Age: 52
End: 2022-06-15
Payer: COMMERCIAL

## 2022-06-15 VITALS
DIASTOLIC BLOOD PRESSURE: 52 MMHG | TEMPERATURE: 97.5 F | RESPIRATION RATE: 20 BRPM | HEART RATE: 64 BPM | HEIGHT: 62 IN | SYSTOLIC BLOOD PRESSURE: 120 MMHG | OXYGEN SATURATION: 98 % | BODY MASS INDEX: 29.28 KG/M2 | WEIGHT: 159.1 LBS

## 2022-06-15 DIAGNOSIS — D17.1 LIPOMA OF SKIN AND SUBCUTANEOUS TISSUE OF TRUNK: ICD-10-CM

## 2022-06-15 PROCEDURE — 99202 OFFICE O/P NEW SF 15 MIN: CPT | Performed by: SURGERY

## 2022-06-15 RX ORDER — NICOTINE POLACRILEX 2 MG
GUM BUCCAL DAILY
COMMUNITY

## 2022-06-15 RX ORDER — CELECOXIB 200 MG/1
200 CAPSULE ORAL DAILY
COMMUNITY
Start: 2022-05-12

## 2022-06-15 ASSESSMENT — ENCOUNTER SYMPTOMS
BACK PAIN: 0
WHEEZING: 0
COUGH: 0
NAUSEA: 0
TROUBLE SWALLOWING: 0
CHOKING: 0
CONSTIPATION: 0
SORE THROAT: 0
SHORTNESS OF BREATH: 0
EYE REDNESS: 0
VOICE CHANGE: 0
COLOR CHANGE: 0
ABDOMINAL PAIN: 0
EYE ITCHING: 0
PHOTOPHOBIA: 0
VOMITING: 0
APNEA: 0
RHINORRHEA: 0
CHEST TIGHTNESS: 0
FACIAL SWELLING: 0
ABDOMINAL DISTENTION: 0
EYE DISCHARGE: 0
ANAL BLEEDING: 0
RECTAL PAIN: 0
SINUS PRESSURE: 0
EYE PAIN: 0
STRIDOR: 0
BLOOD IN STOOL: 0
DIARRHEA: 0

## 2022-06-15 NOTE — PROGRESS NOTES
Lisa Prescott MD [facility]  General Surgery  New Patient Evaluation in Office  Pt Name: Vitor Arteaga  Date of Birth 1970   Today's Date: 6/15/2022  Medical Record Number: [id number]  Referring Provider: JUAN Garcia*  Primary Care Provider: JUAN Saini - CNP  Chief Complaint   Patient presents with   [facility] Surgical Consult     New patient-referred by [place] SMALLS-Right lower quadrant lipoma-Ultrasound done on 6/3/2022     ASSESSMENT       Diagnosis Orders   1. Lipoma of skin and subcutaneous tissue of trunk     History reviewed. No pertinent past medical history. PLANS      1. Schedule Kayzel for nothing at this time  2. She has had this for over a year and is actually gotten smaller. 3. CT scans reviewed with the patient in the office and there is no visible abnormality in the area  4. Ultrasound is equivocal  5. It does not really cause a significant amount of pain and has gotten smaller so at this point she would like to not proceed with anything at this time and just follow along to see what happens  6. On exam I can feel this oval-shaped mass just above the iliac crest and medial on the right-hand side feels consistent with a lipoma     PAGE Lemus is a 47 y.o. female seen in the consultation for evaluation of a possible subcutaneous mass. The mass was first noted 1 year ago. The mass is located in the rlq, is tender. She did have a CT scan of the abdomen which reveals no abnormalities including in the muscle. She did have a ultrasound which describes possibly an ill-defined heterogeneous area either representing an ill-defined lipoma or a focal muscular hypertrophy they describe an area that measures up to 7.1 cm in the right lower quadrant. She says this is gotten smaller over the past year and she has been rubbing all of oil on the skin over the top of it. It does not really bother her as far as pain goes. Past Medical History  History reviewed.  No pertinent past medical history. Past Surgical History  Past Surgical History:   Procedure Laterality Date    COLONOSCOPY  2021    Bobby     Medications  Current Outpatient Medications   Medication Sig Dispense Refill    celecoxib (CELEBREX) 200 MG capsule Take 200 mg by mouth daily       Biotin 1 MG CAPS Take by mouth daily        No current facility-administered medications for this visit. Allergies  has No Known Allergies. Family History  family history includes Cancer in her father, maternal grandfather, and mother; Diabetes in her sister; Kidney Disease in her sister; No Known Problems in her maternal grandmother, paternal grandfather, and paternal grandmother. Social History   reports that she has never smoked. She has never used smokeless tobacco. She reports previous alcohol use. Health Screening Exams  Health Maintenance   Topic Date Due    Diabetic foot exam  Never done    Depression Screen  Never done    HIV screen  Never done    Diabetic microalbuminuria test  Never done    Diabetic retinal exam  Never done    Hepatitis C screen  Never done    Colorectal Cancer Screen  Never done    Shingles vaccine (1 of 2) Never done    COVID-19 Vaccine (3 - Booster for Pfizer series) 08/19/2021    A1C test (Diabetic or Prediabetic)  08/11/2022    Flu vaccine (Season Ended) 09/01/2022    Lipids  04/12/2023    Breast cancer screen  10/26/2023    DTaP/Tdap/Td vaccine (2 - Td or Tdap) 09/15/2025    Cervical cancer screen  09/22/2026    Hepatitis A vaccine  Aged Out    Hepatitis B vaccine  Aged Out    Hib vaccine  Aged Out    Meningococcal (ACWY) vaccine  Aged Out    Pneumococcal 0-64 years Vaccine  Aged Out     Review of Systems  Constitutional: Negative for activity change, appetite change, chills, diaphoresis, fatigue, fever and unexpected weight change.    HENT: Negative for congestion, dental problem, drooling, ear discharge, ear pain, facial swelling, hearing loss, mouth sores, nosebleeds, postnasal drip, rhinorrhea, sinus pressure, sneezing, sore throat, tinnitus, trouble swallowing and voice change. Eyes: Negative for photophobia, pain, discharge, redness, itching and visual disturbance. Respiratory: Negative for apnea, cough, choking, chest tightness, shortness of breath, wheezing and stridor. Cardiovascular: Negative for chest pain, palpitations and leg swelling. Gastrointestinal: Negative for abdominal distention, abdominal pain, anal bleeding, blood in stool, constipation, diarrhea, nausea, rectal pain and vomiting. Endocrine: Negative for cold intolerance, heat intolerance, polydipsia, polyphagia and polyuria. Genitourinary: Negative for decreased urine volume, difficulty urinating, dysuria, enuresis, flank pain, frequency, genital sores, hematuria and urgency. Musculoskeletal: Negative for arthralgias, back pain, gait problem, joint swelling, myalgias, neck pain and neck stiffness. Skin: Negative for color change, pallor, rash and wound. Lipoma right side abdomen tender to touch   Allergic/Immunologic: Negative for environmental allergies, food allergies and immunocompromised state. Neurological: Negative for dizziness, tremors, seizures, syncope, facial asymmetry, speech difficulty, weakness, light-headedness, numbness and headaches. Hematological: Negative for adenopathy. Does not bruise/bleed easily. Psychiatric/Behavioral: Negative for agitation, behavioral problems, confusion, decreased concentration, dysphoric mood, hallucinations, self-injury, sleep disturbance and suicidal ideas. The patient is not nervous/anxious and is not hyperactive    OBJECTIVE    VITALS:  height is 5' 2\" (1.575 m) and weight is 159 lb 1.6 oz (72.2 kg). Her temporal temperature is 97.5 °F (36.4 °C). Her blood pressure is 120/52 (abnormal) and her pulse is 64. Her respiration is 20 and oxygen saturation is 98%.    CONSTITUTIONAL: Alert and oriented times 3, no acute distress and cooperative to examination with proper mood and affect. SKIN: Skin color, texture, turgor normal.   ABDOMEN: Normal shape. No scar(s) present. On the right side she points out an area right lower quadrant near the edge iliac crest just medial to it I can palpate an oval shaped mass that does not change shape or reduce. It does not protrude with cough. There is a visible protrusion of the skin on the right which is not present on the left so certainly I can see this there and by exam is consistent with a lipoma. I did review her CT scan and there is no evidence of a spit Gay hernia. LYMPH: No cervical or inguinal lymphadenopathy. Thank you for the interesting evaluation. Further recommendations as listed above.        Electronically signed by Fletcher Mayberry MD on 6/15/2022 at 9:56 AM

## 2022-06-15 NOTE — PROGRESS NOTES
Subjective:      Patient ID: Margarita Brand is a 47 y.o. female. Chief Complaint   Patient presents with    Surgical Consult     New patient-referred by [place] SMALLS-Right lower quadrant lipoma-Ultrasound done on 6/3/2022       HPI    Review of Systems   Constitutional: Negative for activity change, appetite change, chills, diaphoresis, fatigue, fever and unexpected weight change. HENT: Negative for congestion, dental problem, drooling, ear discharge, ear pain, facial swelling, hearing loss, mouth sores, nosebleeds, postnasal drip, rhinorrhea, sinus pressure, sneezing, sore throat, tinnitus, trouble swallowing and voice change. Eyes: Negative for photophobia, pain, discharge, redness, itching and visual disturbance. Respiratory: Negative for apnea, cough, choking, chest tightness, shortness of breath, wheezing and stridor. Cardiovascular: Negative for chest pain, palpitations and leg swelling. Gastrointestinal: Negative for abdominal distention, abdominal pain, anal bleeding, blood in stool, constipation, diarrhea, nausea, rectal pain and vomiting. Endocrine: Negative for cold intolerance, heat intolerance, polydipsia, polyphagia and polyuria. Genitourinary: Negative for decreased urine volume, difficulty urinating, dysuria, enuresis, flank pain, frequency, genital sores, hematuria and urgency. Musculoskeletal: Negative for arthralgias, back pain, gait problem, joint swelling, myalgias, neck pain and neck stiffness. Skin: Negative for color change, pallor, rash and wound. Lipoma right side abdomen tender to touch   Allergic/Immunologic: Negative for environmental allergies, food allergies and immunocompromised state. Neurological: Negative for dizziness, tremors, seizures, syncope, facial asymmetry, speech difficulty, weakness, light-headedness, numbness and headaches. Hematological: Negative for adenopathy. Does not bruise/bleed easily.    Psychiatric/Behavioral: Negative for agitation, behavioral problems, confusion, decreased concentration, dysphoric mood, hallucinations, self-injury, sleep disturbance and suicidal ideas. The patient is not nervous/anxious and is not hyperactive.       BP (!) 120/52 (Site: Right Upper Arm, Position: Sitting, Cuff Size: Medium Adult)   Pulse 64   Temp 97.5 °F (36.4 °C) (Temporal)   Resp 20   Ht 5' 2\" (1.575 m)   Wt 159 lb 1.6 oz (72.2 kg)   SpO2 98%   BMI 29.10 kg/m²     Objective:   Physical Exam    Assessment:            Plan:              Emily Morales LPN

## 2022-06-15 NOTE — LETTER
[address] [facility]  [place] [address] [address] [facility] [place] [address]  Phone: [phone]  Fax: [phone]           Sarah Serrano MD      Shital 15, 2022     Patient: Rachelle Keyes   MR Number: [id number]   YOB: 1970   Date of Visit: 6/15/2022       Dear Dr. Abby Dimas:    Thank you for referring Rachelle Keyes to me for evaluation/treatment. Below are the relevant portions of my assessment and plan of care. Diagnosis Orders   1. Lipoma of skin and subcutaneous tissue of trunk     History reviewed. No pertinent past medical history. 1. Schedule Kayzel for nothing at this time  2. She has had this for over a year and is actually gotten smaller. 3. CT scans reviewed with the patient in the office and there is no visible abnormality in the area  4. Ultrasound is equivocal  5. It does not really cause a significant amount of pain and has gotten smaller so at this point she would like to not proceed with anything at this time and just follow along to see what happens  6. On exam I can feel this oval-shaped mass just above the iliac crest and medial on the right-hand side feels consistent with a lipoma    If you have questions, please do not hesitate to call me. I look forward to following Maria M Bales along with you.     Sincerely,        Sarah Serrano MD     providers:  JUAN Gilmore - CNP  [address] [place] [address]  Via Fax: [phone]

## 2022-09-22 ENCOUNTER — HOSPITAL ENCOUNTER (OUTPATIENT)
Age: 52
Discharge: HOME | End: 2022-09-22
Payer: COMMERCIAL

## 2022-09-22 LAB
ALBUMIN SERPL-MCNC: 4.3 G/DL (ref 3.5–5.1)
ALP BLD-CCNC: 58 U/L (ref 38–126)
ALT SERPL-CCNC: 12 U/L (ref 11–66)
ANION GAP SERPL CALCULATED.3IONS-SCNC: 10 MEQ/L (ref 8–16)
AST SERPL-CCNC: 19 U/L (ref 5–40)
AVERAGE GLUCOSE: 141 MG/DL (ref 70–126)
BASOPHILS # BLD: 1.1 %
BASOPHILS ABSOLUTE: 0 THOU/MM3 (ref 0–0.1)
BILIRUB SERPL-MCNC: 0.2 MG/DL (ref 0.3–1.2)
BILIRUBIN URINE: NEGATIVE
BLOOD, URINE: NEGATIVE
BUN BLDV-MCNC: 17 MG/DL (ref 7–22)
CALCIUM SERPL-MCNC: 9.4 MG/DL (ref 8.5–10.5)
CHARACTER, URINE: NORMAL
CHLORIDE BLD-SCNC: 105 MEQ/L (ref 98–111)
CHOLESTEROL, TOTAL: 214 MG/DL (ref 100–199)
CO2: 25 MEQ/L (ref 23–33)
COLOR: YELLOW
CREAT SERPL-MCNC: 1 MG/DL (ref 0.4–1.2)
EOSINOPHIL # BLD: 2.3 %
EOSINOPHILS ABSOLUTE: 0.1 THOU/MM3 (ref 0–0.4)
ERYTHROCYTE [DISTWIDTH] IN BLOOD BY AUTOMATED COUNT: 12.4 % (ref 11.5–14.5)
ERYTHROCYTE [DISTWIDTH] IN BLOOD BY AUTOMATED COUNT: 41.5 FL (ref 35–45)
GFR SERPL CREATININE-BSD FRML MDRD: 70 ML/MIN/1.73M2
GLUCOSE BLD-MCNC: 122 MG/DL (ref 70–108)
GLUCOSE, URINE: NEGATIVE MG/DL
HBA1C MFR BLD: 6.7 % (ref 4.4–6.4)
HCT VFR BLD CALC: 40.6 % (ref 37–47)
HDLC SERPL-MCNC: 66 MG/DL
HEMOGLOBIN: 14 GM/DL (ref 12–16)
IMMATURE GRANS (ABS): 0 THOU/MM3 (ref 0–0.07)
IMMATURE GRANULOCYTES: 0 %
KETONES, URINE: NEGATIVE
LDL CHOLESTEROL CALCULATED: 131 MG/DL
LEUKOCYTE EST, POC: NEGATIVE
LYMPHOCYTES # BLD: 56 %
LYMPHOCYTES ABSOLUTE: 2 THOU/MM3 (ref 1–4.8)
MCH RBC QN AUTO: 31.6 PG (ref 26–33)
MCHC RBC AUTO-ENTMCNC: 34.5 GM/DL (ref 32.2–35.5)
MCV RBC AUTO: 91.6 FL (ref 81–99)
MONOCYTES # BLD: 6.9 %
MONOCYTES ABSOLUTE: 0.2 THOU/MM3 (ref 0.4–1.3)
NITRITE, URINE: NEGATIVE
NUCLEATED RED BLOOD CELLS: 0 /100 WBC
PH UA: 5.5 (ref 5–9)
PLATELET # BLD: 284 THOU/MM3 (ref 130–400)
PMV BLD AUTO: 9 FL (ref 9.4–12.4)
POTASSIUM SERPL-SCNC: 4.5 MEQ/L (ref 3.5–5.2)
PROTEIN UA: NEGATIVE MG/DL
RBC # BLD: 4.43 MILL/MM3 (ref 4.2–5.4)
SEG NEUTROPHILS: 33.7 %
SEGMENTED NEUTROPHILS ABSOLUTE COUNT: 1.2 THOU/MM3 (ref 1.8–7.7)
SODIUM BLD-SCNC: 140 MEQ/L (ref 135–145)
SPECIFIC GRAVITY UA: 1.02 (ref 1–1.03)
T4 FREE: 1.04 NG/DL (ref 0.93–1.76)
TOTAL PROTEIN: 6.5 G/DL (ref 6.1–8)
TRIGL SERPL-MCNC: 84 MG/DL (ref 0–199)
TSH SERPL DL<=0.05 MIU/L-ACNC: 1.13 UIU/ML (ref 0.4–4.2)
UROBILINOGEN, URINE: 0.2 EU/DL (ref 0–1)
WBC # BLD: 3.5 THOU/MM3 (ref 4.8–10.8)

## 2022-09-22 PROCEDURE — 83036 HEMOGLOBIN GLYCOSYLATED A1C: CPT

## 2022-09-22 PROCEDURE — 80053 COMPREHEN METABOLIC PANEL: CPT

## 2022-09-22 PROCEDURE — 84439 ASSAY OF FREE THYROXINE: CPT

## 2022-09-22 PROCEDURE — 85025 COMPLETE CBC W/AUTO DIFF WBC: CPT

## 2022-09-22 PROCEDURE — 80061 LIPID PANEL: CPT

## 2022-09-22 PROCEDURE — 36415 COLL VENOUS BLD VENIPUNCTURE: CPT

## 2022-09-22 PROCEDURE — 84443 ASSAY THYROID STIM HORMONE: CPT

## 2022-09-22 PROCEDURE — 81003 URINALYSIS AUTO W/O SCOPE: CPT

## 2022-11-19 ENCOUNTER — NURSE TRIAGE (OUTPATIENT)
Dept: OTHER | Facility: CLINIC | Age: 52
End: 2022-11-19

## 2022-11-19 NOTE — TELEPHONE ENCOUNTER
Location of patient: [place]    Subjective: Caller states called about father who lives out of state. Had questions about whether or not he should be see again for his uti being a chemo pt. States that father has not been acting himself being more aggressive and mild confusion. Asked for father name and birth date caller declined. Current Symptoms: uti, mild confusion, fever        Associated Symptoms: reduced activity    Pain Severity: caller mention agitation     Temperature: 101           Recommended disposition: Go to ED Now    Care advice provided, patient verbalizes understanding; denies any other questions or concerns; instructed to call back for any new or worsening symptoms. Patient/caller agrees to proceed to nearest Emergency Department  This triage is a result of a [facility]. Please do not respond to the triage nurse through this encounter. Any subsequent communication should be directly with the patient.         Reason for Disposition   Nursing judgment    Protocols used: No Protocol Available-ADULT-[place]

## 2022-11-30 ENCOUNTER — HOSPITAL ENCOUNTER (OUTPATIENT)
Dept: WOMENS IMAGING | Age: 52
Discharge: HOME | End: 2022-11-30
Payer: COMMERCIAL

## 2022-11-30 DIAGNOSIS — Z12.31 VISIT FOR SCREENING MAMMOGRAM: ICD-10-CM

## 2022-11-30 PROCEDURE — 77067 SCR MAMMO BI INCL CAD: CPT

## 2024-01-12 ENCOUNTER — HOSPITAL ENCOUNTER (OUTPATIENT)
Dept: WOMENS IMAGING | Age: 54
Discharge: HOME | End: 2024-01-12
Payer: COMMERCIAL

## 2024-01-12 VITALS — BODY MASS INDEX: 26.75 KG/M2 | WEIGHT: 151 LBS | HEIGHT: 63 IN

## 2024-01-12 DIAGNOSIS — Z12.31 VISIT FOR SCREENING MAMMOGRAM: ICD-10-CM

## 2024-01-12 PROCEDURE — 77063 BREAST TOMOSYNTHESIS BI: CPT

## 2024-06-26 ENCOUNTER — TELEPHONE (OUTPATIENT)
Dept: FAMILY MEDICINE CLINIC | Age: 54
End: 2024-06-26

## 2024-06-26 NOTE — TELEPHONE ENCOUNTER
Patient called stating she has to cancel her appointment for 06/27/2024 because her insurance stated they can't find Dr. Hillman name on their list of providers who accepts their insurance.

## 2024-06-28 NOTE — TELEPHONE ENCOUNTER
Patient needs called and told that if his insurance covers our attendings, (Bettye Marsh,[facility]) that it will cover the residents and hopefully he can be rescheduled. Thanks!

## 2025-02-19 ENCOUNTER — HOSPITAL ENCOUNTER (OUTPATIENT)
Dept: WOMENS IMAGING | Age: 55
Discharge: HOME | End: 2025-02-19
Payer: COMMERCIAL

## 2025-02-19 VITALS — WEIGHT: 150 LBS | HEIGHT: 63 IN | BODY MASS INDEX: 26.58 KG/M2

## 2025-02-19 DIAGNOSIS — Z12.31 VISIT FOR SCREENING MAMMOGRAM: ICD-10-CM

## 2025-02-19 PROCEDURE — 77063 BREAST TOMOSYNTHESIS BI: CPT
